# Patient Record
Sex: MALE | Race: WHITE | NOT HISPANIC OR LATINO | URBAN - METROPOLITAN AREA
[De-identification: names, ages, dates, MRNs, and addresses within clinical notes are randomized per-mention and may not be internally consistent; named-entity substitution may affect disease eponyms.]

---

## 2024-09-24 ENCOUNTER — APPOINTMENT (OUTPATIENT)
Dept: SURGERY | Facility: CLINIC | Age: 39
End: 2024-09-24
Payer: COMMERCIAL

## 2024-09-24 VITALS
RESPIRATION RATE: 16 BRPM | WEIGHT: 217.4 LBS | TEMPERATURE: 97.8 F | HEIGHT: 74 IN | DIASTOLIC BLOOD PRESSURE: 77 MMHG | BODY MASS INDEX: 27.9 KG/M2 | HEART RATE: 81 BPM | SYSTOLIC BLOOD PRESSURE: 112 MMHG | OXYGEN SATURATION: 99 %

## 2024-09-24 DIAGNOSIS — K40.90 INGUINAL HERNIA OF RIGHT SIDE WITHOUT OBSTRUCTION OR GANGRENE: ICD-10-CM

## 2024-09-24 DIAGNOSIS — K40.90 INGUINAL HERNIA OF RIGHT SIDE WITHOUT OBSTRUCTION OR GANGRENE: Primary | ICD-10-CM

## 2024-09-24 PROCEDURE — 3008F BODY MASS INDEX DOCD: CPT | Performed by: SURGERY

## 2024-09-24 PROCEDURE — 1036F TOBACCO NON-USER: CPT | Performed by: SURGERY

## 2024-09-24 PROCEDURE — 99203 OFFICE O/P NEW LOW 30 MIN: CPT | Performed by: SURGERY

## 2024-09-24 RX ORDER — CEFAZOLIN SODIUM 2 G/100ML
2 INJECTION, SOLUTION INTRAVENOUS ONCE
OUTPATIENT
Start: 2024-09-24 | End: 2024-09-24

## 2024-09-24 RX ORDER — SODIUM CHLORIDE, SODIUM LACTATE, POTASSIUM CHLORIDE, CALCIUM CHLORIDE 600; 310; 30; 20 MG/100ML; MG/100ML; MG/100ML; MG/100ML
20 INJECTION, SOLUTION INTRAVENOUS CONTINUOUS
OUTPATIENT
Start: 2024-09-24

## 2024-09-24 ASSESSMENT — ENCOUNTER SYMPTOMS
ENDOCRINE NEGATIVE: 1
NEUROLOGICAL NEGATIVE: 1
RESPIRATORY NEGATIVE: 1
PSYCHIATRIC NEGATIVE: 1
HEMATOLOGIC/LYMPHATIC NEGATIVE: 1
CARDIOVASCULAR NEGATIVE: 1
ALLERGIC/IMMUNOLOGIC NEGATIVE: 1
GASTROINTESTINAL NEGATIVE: 1
CONSTITUTIONAL NEGATIVE: 1

## 2024-09-24 NOTE — H&P (VIEW-ONLY)
"Subjective   Patient ID: Saqib Oliveira \"Joao" is a 39 y.o. adult who presents for New Patient Visit and Hernia (NPV- Right Inguinal Hernia ).  Patient saw PCP due to several months of R groin swelling and was diagnosed with a R inguinal hernia, confirmed on US. Was referred to Dr Kate at [facility] and was scheduled for lap inguinal hernia repair next month, but is interested in having surgery earlier due to social and family obligations. Not having worse symptoms, just discomfort on occasion, often following being on his feet for long periods of time. Denies chronic cough, constipation, change in bowel habits, or skin changes in groin.         Review of Systems   Constitutional: Negative.    HENT: Negative.     Respiratory: Negative.     Cardiovascular: Negative.    Gastrointestinal: Negative.    Endocrine: Negative.    Genitourinary: Negative.    Musculoskeletal:         R groin pain and bulge.   Skin: Negative.    Allergic/Immunologic: Negative.    Neurological: Negative.    Hematological: Negative.    Psychiatric/Behavioral: Negative.         Objective   Physical Exam  Constitutional:       Appearance: Normal appearance.   HENT:      Head: Normocephalic and atraumatic.   Cardiovascular:      Rate and Rhythm: Normal rate and regular rhythm.   Pulmonary:      Effort: Pulmonary effort is normal.      Breath sounds: Normal breath sounds.   Abdominal:      General: Abdomen is flat. There is no distension.      Palpations: Abdomen is soft.      Tenderness: There is no abdominal tenderness.      Comments: Well-healed lap incisions.    Musculoskeletal:         General: Normal range of motion.      Comments: R groin bulge, palpable inguinal hernia, reducible, non-tender. No palpable L sided hernia.    Skin:     General: Skin is warm and dry.   Neurological:      General: No focal deficit present.      Mental Status: John is alert and oriented to person, place, and time.   Psychiatric:         Mood and Affect: Mood normal.       "   Behavior: Behavior normal.         Assessment/Plan   Diagnoses and all orders for this visit:  Inguinal hernia of right side without obstruction or gangrene  -     Will schedule for lap R inguinal hernia repair, possible L in coming weeks.     Discussion of the risks of surgery including bleeding, infection, injury to adjacent structures, sexual dysfunction, need for laparotomy, or death was complete. Patient's questions answered. Agrees to proceed. Consent signed.     Other orders  -     NPO Diet Except: Sips with meds; Effective now; Standing  -     Height and weight; Standing  -     Insert and maintain peripheral IV; Standing  -     Saline lock IV; Standing  -     Type And Screen; Standing  -     lactated Ringer's infusion  -     Place in outpatient/hospital ambulatory surgery; Standing  -     Full code; Standing  -     Vital Signs; Standing  -     Pulse oximetry, spot; Standing  -     Apply sequential compression device; Standing  -     ceFAZolin (Ancef) 2 g in dextrose (iso)  mL         Robbi Cornejo MD   General Surgery   09/24/24 9:28 AM

## 2024-09-24 NOTE — PROGRESS NOTES
"Subjective   Patient ID: Saqib Oliveira \"Joao" is a 39 y.o. adult who presents for New Patient Visit and Hernia (NPV- Right Inguinal Hernia ).  Patient saw PCP due to several months of R groin swelling and was diagnosed with a R inguinal hernia, confirmed on US. Was referred to Dr Kate at [facility] and was scheduled for lap inguinal hernia repair next month, but is interested in having surgery earlier due to social and family obligations. Not having worse symptoms, just discomfort on occasion, often following being on his feet for long periods of time. Denies chronic cough, constipation, change in bowel habits, or skin changes in groin.         Review of Systems   Constitutional: Negative.    HENT: Negative.     Respiratory: Negative.     Cardiovascular: Negative.    Gastrointestinal: Negative.    Endocrine: Negative.    Genitourinary: Negative.    Musculoskeletal:         R groin pain and bulge.   Skin: Negative.    Allergic/Immunologic: Negative.    Neurological: Negative.    Hematological: Negative.    Psychiatric/Behavioral: Negative.         Objective   Physical Exam  Constitutional:       Appearance: Normal appearance.   HENT:      Head: Normocephalic and atraumatic.   Cardiovascular:      Rate and Rhythm: Normal rate and regular rhythm.   Pulmonary:      Effort: Pulmonary effort is normal.      Breath sounds: Normal breath sounds.   Abdominal:      General: Abdomen is flat. There is no distension.      Palpations: Abdomen is soft.      Tenderness: There is no abdominal tenderness.      Comments: Well-healed lap incisions.    Musculoskeletal:         General: Normal range of motion.      Comments: R groin bulge, palpable inguinal hernia, reducible, non-tender. No palpable L sided hernia.    Skin:     General: Skin is warm and dry.   Neurological:      General: No focal deficit present.      Mental Status: John is alert and oriented to person, place, and time.   Psychiatric:         Mood and Affect: Mood normal.       "   Behavior: Behavior normal.         Assessment/Plan   Diagnoses and all orders for this visit:  Inguinal hernia of right side without obstruction or gangrene  -     Will schedule for lap R inguinal hernia repair, possible L in coming weeks.     Discussion of the risks of surgery including bleeding, infection, injury to adjacent structures, sexual dysfunction, need for laparotomy, or death was complete. Patient's questions answered. Agrees to proceed. Consent signed.     Other orders  -     NPO Diet Except: Sips with meds; Effective now; Standing  -     Height and weight; Standing  -     Insert and maintain peripheral IV; Standing  -     Saline lock IV; Standing  -     Type And Screen; Standing  -     lactated Ringer's infusion  -     Place in outpatient/hospital ambulatory surgery; Standing  -     Full code; Standing  -     Vital Signs; Standing  -     Pulse oximetry, spot; Standing  -     Apply sequential compression device; Standing  -     ceFAZolin (Ancef) 2 g in dextrose (iso)  mL         Robbi Cornejo MD   General Surgery   09/24/24 9:28 AM

## 2024-10-02 ENCOUNTER — PRE-ADMISSION TESTING (OUTPATIENT)
Dept: PREADMISSION TESTING | Facility: HOSPITAL | Age: 39
End: 2024-10-02
Payer: COMMERCIAL

## 2024-10-02 VITALS
HEART RATE: 78 BPM | OXYGEN SATURATION: 98 % | HEIGHT: 74 IN | WEIGHT: 216.71 LBS | DIASTOLIC BLOOD PRESSURE: 86 MMHG | BODY MASS INDEX: 27.81 KG/M2 | RESPIRATION RATE: 16 BRPM | SYSTOLIC BLOOD PRESSURE: 126 MMHG | TEMPERATURE: 97.5 F

## 2024-10-02 DIAGNOSIS — Z01.818 PRE-OP TESTING: ICD-10-CM

## 2024-10-02 DIAGNOSIS — Z01.818 PRE-OP EXAMINATION: Primary | ICD-10-CM

## 2024-10-02 DIAGNOSIS — K40.90 INGUINAL HERNIA OF RIGHT SIDE WITHOUT OBSTRUCTION OR GANGRENE: ICD-10-CM

## 2024-10-02 PROCEDURE — 99202 OFFICE O/P NEW SF 15 MIN: CPT | Performed by: NURSE PRACTITIONER

## 2024-10-02 PROCEDURE — 87081 CULTURE SCREEN ONLY: CPT | Mod: STJLAB

## 2024-10-02 RX ORDER — CHLORHEXIDINE GLUCONATE ORAL RINSE 1.2 MG/ML
SOLUTION DENTAL
Qty: 473 ML | Refills: 0 | Status: SHIPPED | OUTPATIENT
Start: 2024-10-02

## 2024-10-02 ASSESSMENT — DUKE ACTIVITY SCORE INDEX (DASI)
CAN YOU WALK INDOORS, SUCH AS AROUND YOUR HOUSE: YES
CAN YOU HAVE SEXUAL RELATIONS: YES
TOTAL_SCORE: 50.7
CAN YOU DO LIGHT WORK AROUND THE HOUSE LIKE DUSTING OR WASHING DISHES: YES
CAN YOU PARTICIPATE IN MODERATE RECREATIONAL ACTIVITIES LIKE GOLF, BOWLING, DANCING, DOUBLES TENNIS OR THROWING A BASEBALL OR FOOTBALL: YES
CAN YOU TAKE CARE OF YOURSELF (EAT, DRESS, BATHE, OR USE TOILET): YES
CAN YOU CLIMB A FLIGHT OF STAIRS OR WALK UP A HILL: YES
CAN YOU DO HEAVY WORK AROUND THE HOUSE LIKE SCRUBBING FLOORS OR LIFTING AND MOVING HEAVY FURNITURE: YES
CAN YOU WALK A BLOCK OR TWO ON LEVEL GROUND: YES
CAN YOU DO MODERATE WORK AROUND THE HOUSE LIKE VACUUMING, SWEEPING FLOORS OR CARRYING GROCERIES: YES
DASI METS SCORE: 9
CAN YOU PARTICIPATE IN STRENOUS SPORTS LIKE SWIMMING, SINGLES TENNIS, FOOTBALL, BASKETBALL, OR SKIING: NO
CAN YOU DO YARD WORK LIKE RAKING LEAVES, WEEDING OR PUSHING A MOWER: YES
CAN YOU RUN A SHORT DISTANCE: YES

## 2024-10-02 ASSESSMENT — PAIN - FUNCTIONAL ASSESSMENT: PAIN_FUNCTIONAL_ASSESSMENT: 0-10

## 2024-10-02 ASSESSMENT — ACTIVITIES OF DAILY LIVING (ADL): ADL_SCORE: 0

## 2024-10-02 ASSESSMENT — LIFESTYLE VARIABLES: SMOKING_STATUS: NONSMOKER

## 2024-10-02 ASSESSMENT — PAIN SCALES - GENERAL: PAINLEVEL_OUTOF10: 0 - NO PAIN

## 2024-10-02 NOTE — PREPROCEDURE INSTRUCTIONS
Thank you for visiting Preadmission Testing at [facility]. If you have any changes to your health condition, please call the SURGEON's office to alert them and give them details of your symptoms.        Preoperative Brain Exercises    What are brain exercises?  A brain exercise is any activity that engages your thinking (cognitive) skills.    What types of activities are considered brain exercises?  Jigsaw puzzles, crossword puzzles, word jumble, memory games, word search, and many more.  Many can be found free online or on your phone via a mobile scar.    Why should I do brain exercises before my surgery?  More recent research has shown brain exercise before surgery can lower the risk of postoperative delirium (confusion) which can be especially important for older adults.  Patients who did brain exercises for 5 to 10 hours the days before surgery, cut their risk of postoperative delirium in half up to 1 week after surgery.      Preoperative Deep Breathing Exercises    Why it is important to do deep breathing exercises before my surgery?  Deep breathing exercises strengthen your breathing muscles.  This helps you to recover after your surgery and decreases the chance of breathing complications.    How are the deep breathing exercises done?  Sit straight with your back supported.  Breathe in deeply and slowly through your nose. Your lower rib cage should expand and your abdomen may move forward.  Hold that breath for 3 to 5 seconds.  Breathe out through pursed lips, slowly and completely.  Rest and repeat 10 times every hour while awake.  Rest longer if you become dizzy or lightheaded.      Patient and Family Education   Ways You Can Help Prevent Blood Clots     This handout explains some simple things you can do to help prevent blood clots.      Blood clots are blockages that can form in the body's veins. When a blood clot forms in your deep veins, it may be called a deep vein thrombosis, or DVT for short. Blood clots can  happen in any part of the body where blood flows, but they are most common in the arms and legs. If a piece of a blood clot breaks free and travels to the lungs, it is called a pulmonary embolus (PE). A PE can be a very serious problem.      Being in the hospital or having surgery can raise your chances of getting a blood clot because you may not be well enough to move around as much as you normally do.      Ways you can help prevent blood clots in the hospital         Wearing SCDs. SCDs stands for Sequential Compression Devices.   SCDs are special sleeves that wrap around your legs  They attach to a pump that fills them with air to gently squeeze your legs every few minutes.   This helps return the blood in your legs to your heart.   SCDs should only be taken off when walking or bathing.   SCDs may not be comfortable, but they can help save your life.               Wearing compression stockings - if your doctor orders them. These special snug fitting stockings gently squeeze your legs to help blood flow.       Walking. Walking helps move the blood in your legs.   If your doctor says it is ok, try walking the halls at least   5 times a day. Ask us to help you get up, so you don't fall.      Taking any blood thinning medicines your doctor orders.          ©[facility]; 3/23        Ways you can help prevent blood clots at home       Wearing compression stockings - if your doctor orders them. ? Walking - to help move the blood in your legs.       Taking any blood thinning medicines your doctor orders.      Signs of a blood clot or PE      Tell your doctor or nurse know right away if you have of the problems listed below.    If you are at home, seek medical care right away. Call 911 for chest pain or problems breathing.          Signs of a blood clot (DVT) - such as pain,  swelling, redness or warmth in your arm or leg      Signs of a pulmonary embolism (PE) - such as chest     pain or feeling short of breath

## 2024-10-02 NOTE — PREPROCEDURE INSTRUCTIONS
Medication List            Accurate as of October 2, 2024  9:09 AM. Always use your most recent med list.                chlorhexidine 0.12 % solution  Commonly known as: Peridex  Swish and spit 15 ml for 2 doses, 15mL the night before surgery and 15 ml morning of surgery - swish for 30 seconds -DO NOT SWALLOW, SPIT OUT                                  PRE-OPERATIVE INSTRUCTIONS    You will receive notification one business day prior to your procedure to confirm your arrival time. It is important that you answer your phone and/or check your messages during this time. If you do not hear from the surgery center by 5 pm. the day before your procedure, please call [phone].     Please enter the building through the Outpatient entrance and take the elevator off the lobby to the 2nd floor then check in at the Outpatient Surgery desk on the 2nd floor.    INSTRUCTIONS:  Talk to your surgeon for instructions if you should stop your aspirin, blood thinner, or diabetes medicines.  DO NOT take any multivitamins or over the counter supplements for 7-10 days before surgery.  If not being admitted, you must have an adult immediately available to drive you home after surgery. We also highly recommend you have someone stay with you for the entire day and night of your surgery.  For children having surgery, a parent or legal guardian must accompany them to the surgery center. If this is not possible, please call [phone] to make additional arrangements.  For adults who are unable to consent or make medical decisions for themselves, a legal guardian or Power of  must accompany them to the surgery center. If this is not possible, please call [phone] to make additional arrangements.  Wear comfortable, loose fitting clothing.  All jewelry and piercings must be removed. If you are unable to remove an item or have a dermal piercing, please be sure to tell the nurse when you arrive for surgery.  Nail polish and  make-up must be removed.  Avoid smoking or consuming alcohol for 24 hours before surgery.  To help prevent infection, please take a shower/bath and wash your hair the night before and/or morning of surgery (or follow other specific bathing instructions provided).    Preoperative Fasting Guidelines    Why must I stop eating and drinking near surgery time?  With sedation, food or liquid in your stomach can enter your lungs causing serious complications  Increases nausea and vomiting    When do I need to stop eating and drinking before my surgery?  Do not eat any solid food after midnight the night before your surgery/procedure unless otherwise instructed by your surgeon.   You may have up to 13.5 ounces of clear liquid until TWO hours before your instructed arrival time to the hospital.  This includes water, black tea/coffee, (no milk or cream) apple juice, and electrolyte drinks (Gatorade).   You may chew gum until TWO hours before your surgery/procedure      If applicable, notify your surgeons office immediately of any new skin changes that occur to the surgical limb.      If you have any questions or concerns, please call Pre-Admission Testing at [phone].

## 2024-10-02 NOTE — CPM/PAT H&P
"CPM/PAT Evaluation       Name: Saqib Oliveira (Saqib Oliveira \"John\")  /Age: 1985/39 y.o.     In-Person       Chief Complaint: Scheduled for right side inguinal hernia repair    HPI    ANTHONY is a 40 yo male who developed a right sided bulge in his groin about 6 to 8 weeks ago. The bulge is reducible and uncomfortable at times. He was seen by PCP who determined this was a right sided inguinal hernia. He was then referred to general surgery and subsequently scheduled for laparoscopic repair of right inguinal hernia. Presents to [facility] today for perioperative risk stratification and optimization. He denies any acute pains or bruising from region.Otherwise denies any recent illness, fever/chills, chest pains or shortness of breath.     Past Medical History:   Diagnosis Date    DDD (degenerative disc disease), cervical     Inguinal hernia     right side    Nasal septum fracture     Personal history of other diseases of the circulatory system     History of varicocele    Personal history of other diseases of the respiratory system     History of extrinsic asthma     PCP: Dr. Farah    Past Surgical History:   Procedure Laterality Date    APPENDECTOMY      OTHER SURGICAL HISTORY  2021    Nose surgery x3 ( two as pediatric and one as adult)       Patient  has no history on file for sexual activity.    Family History   Problem Relation Name Age of Onset    Emphysema Paternal Grandfather      Heart attack Paternal Grandfather         No Known Allergies    Prior to Admission medications    Not on File        PAT ROS   Constitutional: Negative for fever, chills, or sweats   ENMT: Negative for nasal discharge, congestion, ear pain, mouth pain, throat pain   Respiratory: Negative for cough, wheezing, shortness of breath   Cardiac: Negative for chest pain, dyspnea on exertion, palpitations     Gastrointestinal: Negative for nausea, vomiting, diarrhea, constipation, abdominal pain  + right inguinal hernia bulge with " discomforts    Genitourinary: Negative for dysuria, flank pain, frequency, hematuria   Musculoskeletal: Negative for decreased ROM, pain, swelling, weakness   Neurological: Negative for dizziness, confusion, headache  Psychiatric: Negative for mood changes   Skin: Negative for itching, rash, ulcer    Hematologic/Lymph: Negative for bruising, easy bleeding  Allergic/Immunologic: Negative itching, sneezing, swelling      Physical Exam  Constitutional:       Appearance: Normal appearance.   HENT:      Head: Normocephalic.      Mouth/Throat:      Mouth: Mucous membranes are moist.   Eyes:      Extraocular Movements: Extraocular movements intact.   Cardiovascular:      Rate and Rhythm: Normal rate and regular rhythm.   Pulmonary:      Effort: Pulmonary effort is normal.      Breath sounds: Normal breath sounds.   Abdominal:      General: Abdomen is flat.      Palpations: Abdomen is soft.      Hernia: A hernia is present. Hernia is present in the right inguinal area.   Musculoskeletal:         General: Normal range of motion.      Cervical back: Normal range of motion.   Skin:     General: Skin is warm and dry.   Neurological:      General: No focal deficit present.      Mental Status: John is alert.   Psychiatric:         Mood and Affect: Mood normal.          PAT AIRWAY:   Airway:     Neck ROM::  Full  normal        Visit Vitals  /86   Pulse 78   Temp 36.4 °C (97.5 °F) (Temporal)   Resp 16       DASI Risk Score      Flowsheet Row Pre-Admission Testing from 10/2/2024 in [facility]   Can you take care of yourself (eat, dress, bathe, or use toilet)?  2.75 filed at 10/02/2024 0852   Can you walk indoors, such as around your house? 1.75 filed at 10/02/2024 0852   Can you walk a block or two on level ground?  2.75 filed at 10/02/2024 0852   Can you climb a flight of stairs or walk up a hill? 5.5 filed at 10/02/2024 0852   Can you run a short distance? 8 filed at 10/02/2024 0852   Can you do light work  around the house like dusting or washing dishes? 2.7 filed at 10/02/2024 0852   Can you do moderate work around the house like vacuuming, sweeping floors or carrying groceries? 3.5 filed at 10/02/2024 0852   Can you do heavy work around the house like scrubbing floors or lifting and moving heavy furniture?  8 filed at 10/02/2024 0852   Can you do yard work like raking leaves, weeding or pushing a mower? 4.5 filed at 10/02/2024 0852   Can you have sexual relations? 5.25 filed at 10/02/2024 0852   Can you participate in moderate recreational activities like golf, bowling, dancing, doubles tennis or throwing a baseball or football? 6 filed at 10/02/2024 0852   Can you participate in strenous sports like swimming, singles tennis, football, basketball, or skiing? 0 filed at 10/02/2024 0852   DASI SCORE 50.7 filed at 10/02/2024 0852   METS Score (Will be calculated only when all the questions are answered) 9 filed at 10/02/2024 0852          Caprini DVT Assessment      Flowsheet Row Pre-Admission Testing from 10/2/2024 in [facility]   DVT Score 3 filed at 10/02/2024 0851   Surgical Factors Major surgery planned, including arthroscopic and laproscopic (1-2 hours) filed at 10/02/2024 0851   BMI 30 or less filed at 10/02/2024 0851          Modified Frailty Index      Flowsheet Row Pre-Admission Testing from 10/2/2024 in [facility]   Non-independent functional status (problems with dressing, bathing, personal grooming, or cooking) 0 filed at 10/02/2024 0852   History of diabetes mellitus  0 filed at 10/02/2024 0852   History of COPD 0 filed at 10/02/2024 0852   History of CHF No filed at 10/02/2024 0852   History of MI 0 filed at 10/02/2024 0852   History of Percutaneous Coronary Intervention, Cardiac Surgery, or Angina No filed at 10/02/2024 0852   Hypertension requiring the use of medication  0 filed at 10/02/2024 0852   Peripheral vascular disease 0 filed at 10/02/2024 0852   Impaired  sensorium (cognitive impairement or loss, clouding, or delirium) 0 filed at 10/02/2024 0852   TIA or CVA withouy residual deficit 0 filed at 10/02/2024 0852   Cerebrovascular accident with deficit 0 filed at 10/02/2024 0852   Modified Frailty Index Calculator 0 filed at 10/02/2024 0852          CHADS2 Stroke Risk  Current as of 11 minutes ago        N/A 3 to 100%: High Risk   2 to < 3%: Medium Risk   0 to < 2%: Low Risk     Last Change: N/A          This score determines the patient's risk of having a stroke if the patient has atrial fibrillation.        This score is not applicable to this patient. Components are not calculated.          Revised Cardiac Risk Index      Flowsheet Row Pre-Admission Testing from 10/2/2024 in [facility]   High-Risk Surgery (Intraperitoneal, Intrathoracic,Suprainguinal vascular) 0 filed at 10/02/2024 0852   History of ischemic heart disease (History of MI, History of positive exercuse test, Current chest paint considered due to myocardial ischemia, Use of nitrate therapy, ECG with pathological Q Waves) 0 filed at 10/02/2024 0852   History of congestive heart failure (pulmonary edemia, bilateral rales or S3 gallop, Paroxysmal nocturnal dyspnea, CXR showing pulmonary vascular redistribution) 0 filed at 10/02/2024 0852   History of cerebrovascular disease (Prior TIA or stroke) 0 filed at 10/02/2024 0852   Pre-operative insulin treatment 0 filed at 10/02/2024 0852   Pre-operative creatinine>2 mg/dl 0 filed at 10/02/2024 0852   Revised Cardiac Risk Calculator 0 filed at 10/02/2024 0852          Apfel Simplified Score      Flowsheet Row Pre-Admission Testing from 10/2/2024 in [facility]   Smoking status 1 filed at 10/02/2024 0852   History of motion sickness or PONV  0 filed at 10/02/2024 0852   Use of postoperative opioids 1 filed at 10/02/2024 0852   Gender - Female 0=No filed at 10/02/2024 0852   Apfel Simplified Score Calculator 2 filed at 10/02/2024 0852           Risk Analysis Index Results This Encounter         10/2/2024  0852             Do you live in a place other than your own home?: 0    When did you begin living in the place you are currently residing?: Greater than one year ago    Any kidney failure, kidney not working well, or seeing a kidney doctor (nephrologist)? If yes, was this for kidney stones or another problem?: 0 No    Any history of chronic (long-term) congestive heart failure (CHF)?: 0 No    Any shortness of breath when resting?: 0 No    In the past five years, have you been diagnosed with or treated for cancer?: No    During the last 3 months has it become difficult for you to remember things or organize your thoughts?: 0 No    Have you lost weight of 10 pounds or more in the past 3 months without trying?: 0 No    Do you have any loss of appetitie?: 0 No    Getting Around (Mobility): 0 Can get around without help    Eatin Can plan and prepare own meals    Toiletin Can use toilet without any help    Personal Hygiene (Bathing, Hand Washing, Changing Clothes): 0 Can shower or bathe without any help    TORO Cancer History: Patient does not indicate history of cancer    Total Risk Analysis Index Score Without Cancer: 11    Total Risk Analysis Index Score: 11          Stop Bang Score      Flowsheet Row [place] [facility]   Do you snore loudly? 0 filed at 10/02/2024 0851   Do you often feel tired or fatigued after your sleep? 0 filed at 10/02/2024 0851   Has anyone ever observed you stop breathing in your sleep? 0 filed at 10/02/2024 0851   Do you have or are you being treated for high blood pressure? 0 filed at 10/02/2024 0851   Recent BMI (Calculated) 27.8 filed at 10/02/2024 0851   Is BMI greater than 35 kg/m2? 0=No filed at 10/02/2024 0851   Age older than 50 years old? 0=No filed at 10/02/2024 0851   Is your neck circumference greater than 17 inches (Male) or 16 inches (Female)? 0 filed at  10/02/2024 0851   Gender - Male 1=Yes filed at 10/02/2024 0851   STOP-BANG Total Score 1 filed at 10/02/2024 0851            Assessment and Plan:     Pre-Op  39-year-old male scheduled for right laparoscopic inguinal hernia repair on 10/7/2024 with Dr. Cornejo. MRSA and oral chlorhexidine prescribed. Otherwise no further orders indicated.     Cardiac  -No diagnosis or significant findings on chart review or clinical presentation and evaluation.  DASI Score: 50.7   MET Score: 9  RCRI 0, 3.9% risk for postoperative MACE    Pulmonary  -Asthma, exercise-induced-as a child (resolved)  -Preoperative deep breathing educational handout provided to patient.  -STOP BAN   points which is a low risk for moderate to severe ELISEO    Neuro  DDD, cervical spine    GI  -Inguinal hernia, right side-reason for surgery  Apfel: 2 points 39% risk for post operative N/V    ENT  Nasal septum fracture, has undergone surgery 3 times (2 as pediatric in 1 as adult)    Skin check: Patient was instructed to make surgeon aware of any skin changes/concerns prior to surgery.     Anesthesia:  Patient denies any anesthesia complications.     See risk scores as previously documented.

## 2024-10-04 LAB — STAPHYLOCOCCUS SPEC CULT: NORMAL

## 2024-10-07 ENCOUNTER — HOSPITAL ENCOUNTER (OUTPATIENT)
Facility: HOSPITAL | Age: 39
Setting detail: OUTPATIENT SURGERY
Discharge: HOME | End: 2024-10-07
Attending: SURGERY | Admitting: SURGERY
Payer: COMMERCIAL

## 2024-10-07 ENCOUNTER — ANESTHESIA (OUTPATIENT)
Dept: OPERATING ROOM | Facility: HOSPITAL | Age: 39
End: 2024-10-07
Payer: COMMERCIAL

## 2024-10-07 ENCOUNTER — ANESTHESIA EVENT (OUTPATIENT)
Dept: OPERATING ROOM | Facility: HOSPITAL | Age: 39
End: 2024-10-07
Payer: COMMERCIAL

## 2024-10-07 VITALS
BODY MASS INDEX: 27.72 KG/M2 | WEIGHT: 216 LBS | TEMPERATURE: 97.9 F | RESPIRATION RATE: 17 BRPM | HEART RATE: 98 BPM | OXYGEN SATURATION: 96 % | DIASTOLIC BLOOD PRESSURE: 65 MMHG | SYSTOLIC BLOOD PRESSURE: 118 MMHG | HEIGHT: 74 IN

## 2024-10-07 DIAGNOSIS — G89.18 POSTOPERATIVE PAIN: ICD-10-CM

## 2024-10-07 DIAGNOSIS — K40.90 INGUINAL HERNIA OF RIGHT SIDE WITHOUT OBSTRUCTION OR GANGRENE: Primary | ICD-10-CM

## 2024-10-07 PROCEDURE — 3700000001 HC GENERAL ANESTHESIA TIME - INITIAL BASE CHARGE: Performed by: SURGERY

## 2024-10-07 PROCEDURE — 2500000005 HC RX 250 GENERAL PHARMACY W/O HCPCS: Performed by: SURGERY

## 2024-10-07 PROCEDURE — 7100000009 HC PHASE TWO TIME - INITIAL BASE CHARGE: Performed by: SURGERY

## 2024-10-07 PROCEDURE — A49650 PR LAP,INGUINAL HERNIA REPR,INITIAL: Performed by: NURSE ANESTHETIST, CERTIFIED REGISTERED

## 2024-10-07 PROCEDURE — 2720000007 HC OR 272 NO HCPCS: Performed by: SURGERY

## 2024-10-07 PROCEDURE — 7100000002 HC RECOVERY ROOM TIME - EACH INCREMENTAL 1 MINUTE: Performed by: SURGERY

## 2024-10-07 PROCEDURE — 2500000004 HC RX 250 GENERAL PHARMACY W/ HCPCS (ALT 636 FOR OP/ED): Performed by: ANESTHESIOLOGY

## 2024-10-07 PROCEDURE — 2780000003 HC OR 278 NO HCPCS: Performed by: SURGERY

## 2024-10-07 PROCEDURE — C1781 MESH (IMPLANTABLE): HCPCS | Performed by: SURGERY

## 2024-10-07 PROCEDURE — 7100000001 HC RECOVERY ROOM TIME - INITIAL BASE CHARGE: Performed by: SURGERY

## 2024-10-07 PROCEDURE — 49650 LAP ING HERNIA REPAIR INIT: CPT | Performed by: SURGERY

## 2024-10-07 PROCEDURE — 7100000010 HC PHASE TWO TIME - EACH INCREMENTAL 1 MINUTE: Performed by: SURGERY

## 2024-10-07 PROCEDURE — 2500000004 HC RX 250 GENERAL PHARMACY W/ HCPCS (ALT 636 FOR OP/ED): Performed by: SURGERY

## 2024-10-07 PROCEDURE — 3600000003 HC OR TIME - INITIAL BASE CHARGE - PROCEDURE LEVEL THREE: Performed by: SURGERY

## 2024-10-07 PROCEDURE — 2500000004 HC RX 250 GENERAL PHARMACY W/ HCPCS (ALT 636 FOR OP/ED): Performed by: NURSE ANESTHETIST, CERTIFIED REGISTERED

## 2024-10-07 PROCEDURE — A49650 PR LAP,INGUINAL HERNIA REPR,INITIAL: Performed by: ANESTHESIOLOGY

## 2024-10-07 PROCEDURE — 3600000008 HC OR TIME - EACH INCREMENTAL 1 MINUTE - PROCEDURE LEVEL THREE: Performed by: SURGERY

## 2024-10-07 PROCEDURE — 2500000005 HC RX 250 GENERAL PHARMACY W/O HCPCS: Performed by: NURSE ANESTHETIST, CERTIFIED REGISTERED

## 2024-10-07 PROCEDURE — 3700000002 HC GENERAL ANESTHESIA TIME - EACH INCREMENTAL 1 MINUTE: Performed by: SURGERY

## 2024-10-07 PROCEDURE — 2500000001 HC RX 250 WO HCPCS SELF ADMINISTERED DRUGS (ALT 637 FOR MEDICARE OP): Performed by: ANESTHESIOLOGY

## 2024-10-07 DEVICE — BARD MESH, 3" X 6" (7.6 CM X 15 CM)
Type: IMPLANTABLE DEVICE | Site: ABDOMEN | Status: FUNCTIONAL
Brand: BARD

## 2024-10-07 RX ORDER — DIPHENHYDRAMINE HYDROCHLORIDE 50 MG/ML
INJECTION INTRAMUSCULAR; INTRAVENOUS AS NEEDED
Status: DISCONTINUED | OUTPATIENT
Start: 2024-10-07 | End: 2024-10-07

## 2024-10-07 RX ORDER — ROCURONIUM BROMIDE 50 MG/5 ML
SYRINGE (ML) INTRAVENOUS AS NEEDED
Status: DISCONTINUED | OUTPATIENT
Start: 2024-10-07 | End: 2024-10-07

## 2024-10-07 RX ORDER — LIDOCAINE HYDROCHLORIDE 10 MG/ML
0.1 INJECTION, SOLUTION INFILTRATION; PERINEURAL ONCE
Status: CANCELLED | OUTPATIENT
Start: 2024-10-07 | End: 2024-10-07

## 2024-10-07 RX ORDER — ONDANSETRON HYDROCHLORIDE 2 MG/ML
INJECTION, SOLUTION INTRAVENOUS AS NEEDED
Status: DISCONTINUED | OUTPATIENT
Start: 2024-10-07 | End: 2024-10-07

## 2024-10-07 RX ORDER — KETOROLAC TROMETHAMINE 30 MG/ML
INJECTION, SOLUTION INTRAMUSCULAR; INTRAVENOUS AS NEEDED
Status: DISCONTINUED | OUTPATIENT
Start: 2024-10-07 | End: 2024-10-07

## 2024-10-07 RX ORDER — GLYCOPYRROLATE 0.2 MG/ML
INJECTION INTRAMUSCULAR; INTRAVENOUS AS NEEDED
Status: DISCONTINUED | OUTPATIENT
Start: 2024-10-07 | End: 2024-10-07

## 2024-10-07 RX ORDER — CEFAZOLIN SODIUM 2 G/100ML
2 INJECTION, SOLUTION INTRAVENOUS ONCE
Status: COMPLETED | OUTPATIENT
Start: 2024-10-07 | End: 2024-10-07

## 2024-10-07 RX ORDER — PROPOFOL 10 MG/ML
INJECTION, EMULSION INTRAVENOUS AS NEEDED
Status: DISCONTINUED | OUTPATIENT
Start: 2024-10-07 | End: 2024-10-07

## 2024-10-07 RX ORDER — HYDROMORPHONE HYDROCHLORIDE 0.2 MG/ML
0.2 INJECTION INTRAMUSCULAR; INTRAVENOUS; SUBCUTANEOUS EVERY 5 MIN PRN
Status: DISCONTINUED | OUTPATIENT
Start: 2024-10-07 | End: 2024-10-07 | Stop reason: HOSPADM

## 2024-10-07 RX ORDER — SODIUM CHLORIDE, SODIUM LACTATE, POTASSIUM CHLORIDE, CALCIUM CHLORIDE 600; 310; 30; 20 MG/100ML; MG/100ML; MG/100ML; MG/100ML
20 INJECTION, SOLUTION INTRAVENOUS CONTINUOUS
Status: DISCONTINUED | OUTPATIENT
Start: 2024-10-07 | End: 2024-10-07 | Stop reason: HOSPADM

## 2024-10-07 RX ORDER — OXYCODONE HYDROCHLORIDE 5 MG/1
5 TABLET ORAL EVERY 4 HOURS PRN
Status: DISCONTINUED | OUTPATIENT
Start: 2024-10-07 | End: 2024-10-07 | Stop reason: HOSPADM

## 2024-10-07 RX ORDER — NEOSTIGMINE METHYLSULFATE 1 MG/ML
INJECTION INTRAVENOUS AS NEEDED
Status: DISCONTINUED | OUTPATIENT
Start: 2024-10-07 | End: 2024-10-07

## 2024-10-07 RX ORDER — MIDAZOLAM HYDROCHLORIDE 1 MG/ML
INJECTION, SOLUTION INTRAMUSCULAR; INTRAVENOUS AS NEEDED
Status: DISCONTINUED | OUTPATIENT
Start: 2024-10-07 | End: 2024-10-07

## 2024-10-07 RX ORDER — SODIUM CHLORIDE, SODIUM LACTATE, POTASSIUM CHLORIDE, CALCIUM CHLORIDE 600; 310; 30; 20 MG/100ML; MG/100ML; MG/100ML; MG/100ML
100 INJECTION, SOLUTION INTRAVENOUS CONTINUOUS
Status: DISCONTINUED | OUTPATIENT
Start: 2024-10-07 | End: 2024-10-07 | Stop reason: HOSPADM

## 2024-10-07 RX ORDER — ONDANSETRON 4 MG/1
4 TABLET, FILM COATED ORAL EVERY 8 HOURS PRN
Qty: 20 TABLET | Refills: 0 | Status: SHIPPED | OUTPATIENT
Start: 2024-10-07

## 2024-10-07 RX ORDER — LIDOCAINE HYDROCHLORIDE 20 MG/ML
INJECTION, SOLUTION EPIDURAL; INFILTRATION; INTRACAUDAL; PERINEURAL AS NEEDED
Status: DISCONTINUED | OUTPATIENT
Start: 2024-10-07 | End: 2024-10-07

## 2024-10-07 RX ORDER — OXYCODONE HYDROCHLORIDE 5 MG/1
5 TABLET ORAL EVERY 6 HOURS PRN
Qty: 15 TABLET | Refills: 0 | Status: SHIPPED | OUTPATIENT
Start: 2024-10-07

## 2024-10-07 RX ORDER — FENTANYL CITRATE 50 UG/ML
INJECTION, SOLUTION INTRAMUSCULAR; INTRAVENOUS AS NEEDED
Status: DISCONTINUED | OUTPATIENT
Start: 2024-10-07 | End: 2024-10-07

## 2024-10-07 RX ORDER — HYDROMORPHONE HYDROCHLORIDE 1 MG/ML
INJECTION, SOLUTION INTRAMUSCULAR; INTRAVENOUS; SUBCUTANEOUS AS NEEDED
Status: DISCONTINUED | OUTPATIENT
Start: 2024-10-07 | End: 2024-10-07

## 2024-10-07 RX ORDER — BUPIVACAINE HCL/EPINEPHRINE 0.5-1:200K
VIAL (ML) INJECTION AS NEEDED
Status: DISCONTINUED | OUTPATIENT
Start: 2024-10-07 | End: 2024-10-07 | Stop reason: HOSPADM

## 2024-10-07 SDOH — HEALTH STABILITY: MENTAL HEALTH: CURRENT SMOKER: 0

## 2024-10-07 ASSESSMENT — PAIN DESCRIPTION - DESCRIPTORS
DESCRIPTORS: DULL;SORE
DESCRIPTORS: BURNING;ACHING
DESCRIPTORS: ACHING

## 2024-10-07 ASSESSMENT — PAIN - FUNCTIONAL ASSESSMENT
PAIN_FUNCTIONAL_ASSESSMENT: 0-10

## 2024-10-07 ASSESSMENT — PAIN DESCRIPTION - LOCATION
LOCATION: ABDOMEN
LOCATION: ABDOMEN

## 2024-10-07 ASSESSMENT — COLUMBIA-SUICIDE SEVERITY RATING SCALE - C-SSRS
2. HAVE YOU ACTUALLY HAD ANY THOUGHTS OF KILLING YOURSELF?: NO
6. HAVE YOU EVER DONE ANYTHING, STARTED TO DO ANYTHING, OR PREPARED TO DO ANYTHING TO END YOUR LIFE?: NO
1. IN THE PAST MONTH, HAVE YOU WISHED YOU WERE DEAD OR WISHED YOU COULD GO TO SLEEP AND NOT WAKE UP?: NO

## 2024-10-07 ASSESSMENT — PAIN SCALES - GENERAL
PAINLEVEL_OUTOF10: 7
PAINLEVEL_OUTOF10: 3
PAINLEVEL_OUTOF10: 5 - MODERATE PAIN
PAINLEVEL_OUTOF10: 4
PAINLEVEL_OUTOF10: 7

## 2024-10-07 NOTE — OP NOTE
"Repair Inguinal Hernia Laparoscopy (R) Operative Note     Date: 10/7/2024  OR Location: [facility] OR    Name: Saqib Oliveira \"John\", : 1985, Age: 39 y.o., MRN: [id number], Sex: adult    Diagnosis  Pre-op Diagnosis      * Inguinal hernia of right side without obstruction or gangrene [K40.90] Post-op Diagnosis     * Inguinal hernia of right side without obstruction or gangrene [K40.90]     Procedures  Repair Inguinal Hernia Laparoscopy  79426 - MO LAPAROSCOPY SURG RPR INITIAL INGUINAL HERNIA      Surgeons      * Robbi Cornejo - Primary    Resident/Fellow/Other Assistant:  Surgeons and Role:  * No surgeons found with a matching role *    Procedure Summary  Anesthesia: General  ASA: I  Anesthesia Staff: Anesthesiologist: Dragan Roman MD; Jayson Cedillo MD  CRNA: JOSE Vigil-CRNA  Estimated Blood Loss: 100 mL  Intra-op Medications:   Administrations occurring from 1230 to 1435 on 10/07/24:   Medication Name Total Dose   lactated Ringer's infusion Cannot be calculated   ceFAZolin (Ancef) 2 g in dextrose (iso)  mL 2 g              Anesthesia Record               Intraprocedure I/O Totals          Intake    lactated Ringer's infusion 1000.00 mL    Total Intake 1000 mL          Specimen: No specimens collected     Staff:   Hughulator: Catherine  Circulator: Dangelo Brandt Person: Young Lopez Scrub: Samara Lopez Circulator: Jaxson         Drains and/or Catheters: * None in log *    Tourniquet Times:         Implants:  Implants       Type Name Action Serial No.      Surgical Mesh Sling Implant PATCH, MESH, MARLEX, 3 X 6 IN, POLYPROPYLENE - [id number] Implanted               Findings: right direct inguinal hernia with large cord lipoma. No left-sided hernia.     Indications: John Oliveira is an 39 y.o. adult who is having surgery for Inguinal hernia of right side without obstruction or gangrene [K40.90].     The patient was seen in the preoperative area. The risks, benefits, complications, treatment " options, non-operative alternatives, expected recovery and outcomes were discussed with the patient. The possibilities of reaction to medication, pulmonary aspiration, injury to surrounding structures, bleeding, recurrent infection, the need for additional procedures, failure to diagnose a condition, and creating a complication requiring transfusion or operation were discussed with the patient. The patient concurred with the proposed plan, giving informed consent.  The site of surgery was properly noted/marked if necessary per policy. The patient has been actively warmed in preoperative area. Preoperative antibiotics have been ordered and given within 1 hours of incision. Venous thrombosis prophylaxis have been ordered including bilateral sequential compression devices    Procedure Details: The patient was taken to the operating room. A presurgical huddle was completed which included the patient's identifiers, consent, procedure, and equipment. The patient was placed in the supine position and general endotracheal anesthesia was induced. The patient was prepped and draped in the normal sterile fashion. IV prophylactic antibiotics were given prior to incision.    A surgical time out was performed prior to incision. A supraumbilical incision was made and dissection was carried down to the umbilical stalk with cautery. This was elevated and the midline fascia was incised sharply. Blunt dissection was used to enter the peritoneal cavity. A finger sweep revealed adherent omentum to the prior supraumbilical incision. A 0 Vicryl figure of eight stitch was placed in the fascia and a 12 mm Jaiden trocar was inserted into the abdomen. The abdomen was insufflated to 15 mmHg. The scope was inserted and confirmed no injuries to underlying structures. Two 5 mm trocars were placed in the left and right midclavicular line. The patient was placed in Trendelenburg.     There was evidence of a right sided direct inguinal hernia. There  was no left-sided hernia. An incision was made in the anterior peritoneal wall 5 cm cephalad to the visualized hernia. The peritoneum was peeled away from the myopectineal orifice and the hernia sac was fully reduced. The Vas Deferens was visualized and protected along with the gonadal vessels by bluntly peeling away the sac from these structures. The femoral canal was visualized and no hernia was appreciated. The dissection was continued out laterally peeling peritoneum away from the abdominal wall toward the anterior superior iliac spine to create room for a mesh. A 7.6 cm x 15 cm mesh was placed to cover all potential hernia sites, centered on the visualized defect. The tacker was used to place a single tack on the pubic tubercle and two tacks laterally and superiorly to fixate the mesh. The peritoneal flap was then closed over the mesh using tacks.    The adherent omentum was sharply transected and was hemostatic.     All counts were correct. The abdomen was then desufflated and all ports were removed under direct visualization. 20 cc of 0.5% Marcaine was administered to the port sites. The port sites were closed with 4-0 monocryl subcutaneous suture, and covered with Dermabond skin adhesive.     All surgical counts were again correct. The patient tolerated the procedure well and was transferred to the PACU in stable condition.     Complications:  None; patient tolerated the procedure well.    Disposition: PACU - hemodynamically stable.  Condition: stable         Additional Details:     Attending Attestation: I was present and scrubbed for the entire procedure.    Robbi Cornejo  Phone Number: [phone]

## 2024-10-07 NOTE — ANESTHESIA PROCEDURE NOTES
Airway  Date/Time: 10/7/2024 12:55 PM  Urgency: elective    Airway not difficult    Staffing  Performed: CRNA   Authorized by: Dragan Roman MD    Performed by: JOSE Vigil-CRNA  Patient location during procedure: OR    Indications and Patient Condition  Indications for airway management: anesthesia  Spontaneous Ventilation: absent  Sedation level: deep  Preoxygenated: yes  Patient position: sniffing  Mask difficulty assessment: 1 - vent by mask    Final Airway Details  Final airway type: endotracheal airway      Successful airway: ETT  Cuffed: yes   Successful intubation technique: video laryngoscopy  Facilitating devices/methods: intubating stylet  Blade size: #4  ETT size (mm): 7.5  Cormack-Lehane Classification: grade IIb - view of arytenoids or posterior of glottis only  Placement verified by: capnometry   Measured from: teeth  ETT to teeth (cm): 23  Number of attempts at approach: 2  Ventilation between attempts: none  Number of other approaches attempted: 1    Other Attempts  Unsuccessful attempted endotracheal techniques: direct laryngoscopy    Additional Comments  Grade 3 view with DL using Mac 4

## 2024-10-07 NOTE — ANESTHESIA PREPROCEDURE EVALUATION
"Patient: Saqib Oliveira \"John\"    Procedure Information       Date/Time: 10/07/24 1230    Procedure: Repair Inguinal Hernia Laparoscopy (Right)    Location: [facility] OR 10 / [facility] OR    Surgeons: Robbi Cornejo MD            Relevant Problems   Anesthesia (within normal limits)      Cardiac (within normal limits)      Pulmonary  ELISEO Risk, snoring      Neuro (within normal limits)      GI (within normal limits)      /Renal (within normal limits)      Liver (within normal limits)      Endocrine (within normal limits)      Hematology (within normal limits)       Clinical information reviewed:   Tobacco  Allergies  Meds   Med Hx  Surg Hx   Fam Hx  Soc Hx        NPO Detail:  NPO/Void Status  Carbohydrate Drink Given Prior to Surgery? : N  Date of Last Liquid: 10/06/24  Time of Last Liquid: 2300  Date of Last Solid: 10/06/24  Time of Last Solid: 2300  Last Intake Type: Clear fluids  Time of Last Void: 0700         Physical Exam    Airway  Mallampati: II  TM distance: >3 FB  Neck ROM: full     Cardiovascular   Rhythm: regular  Rate: normal     Dental    Pulmonary - normal exam     Abdominal            Anesthesia Plan    History of general anesthesia?: yes  History of complications of general anesthesia?: no    ASA 1     general     The patient is not a current smoker.    intravenous induction   Anesthetic plan and risks discussed with patient.  Use of blood products discussed with patient who consented to blood products.      "

## 2024-10-08 ENCOUNTER — TELEPHONE (OUTPATIENT)
Dept: SURGERY | Facility: CLINIC | Age: 39
End: 2024-10-08
Payer: COMMERCIAL

## 2024-10-09 NOTE — ANESTHESIA POSTPROCEDURE EVALUATION
"Patient: Saqib Oliveira \"John\"    Procedure Summary       Date: 10/07/24 Room / Location: [facility] OR 10 / [facility]    Anesthesia Start: 1247 Anesthesia Stop: 1539    Procedure: Repair Inguinal Hernia Laparoscopy (Right: Abdomen) Diagnosis:       Inguinal hernia of right side without obstruction or gangrene      (Inguinal hernia of right side without obstruction or gangrene [K40.90])    Surgeons: Robbi Cornejo MD Responsible Provider: Jayson Cedillo MD    Anesthesia Type: general ASA Status: 1            Anesthesia Type: general    Vitals Value Taken Time   /67 10/07/24 1615   Temp 36 °C (96.8 °F) 10/07/24 1615   Pulse 84 10/07/24 1616   Resp 11 10/07/24 1616   SpO2 94 % 10/07/24 1616   Vitals shown include unfiled device data.    Anesthesia Post Evaluation    Patient location during evaluation: PACU  Patient participation: complete - patient participated  Level of consciousness: awake and alert  Pain management: satisfactory to patient  Airway patency: patent  Cardiovascular status: acceptable  Respiratory status: acceptable  Hydration status: acceptable  Postoperative Nausea and Vomiting: none        No notable events documented.    "

## 2024-10-14 ENCOUNTER — TELEPHONE (OUTPATIENT)
Dept: SURGERY | Facility: CLINIC | Age: 39
End: 2024-10-14
Payer: COMMERCIAL

## 2024-10-14 NOTE — TELEPHONE ENCOUNTER
Discussed with pt again his concerns,  pt now states his last bm was last night sometime after using a laxative,  admits to more liquid stool.  Last bm prior to that was 48 hours.  Pt now stating that he is only dribbles urine when he tries to urinate and can not get a stream going,  not emptying his bladder,  admits to abdominal distention and tenderness when asked.  Advised ED for bladder scan,  pt then states he doesn't know if he will go.  I reviewed symptoms with him again since this is the 3rd contact today with pt and office in regards to these symptoms.  And this afternoon pt describes symptoms differently and the urinating symptoms worse then this morning.  Again pt encouraged to go to the ED if he is not urinating.  During this call pt does not discuss the complaints of the tingling in bilateral lower extremities as discussed this am.  However I did tell pt that Dr. Cornejo recommended applying heat to help with symptoms.

## 2024-10-14 NOTE — TELEPHONE ENCOUNTER
Patient called back and states he feels like his constipation maybe returning. I advised patient to take OTC milk of magnesia. He states having some urine problems as well. He is not having a steady stream. He was advised by myself and Victoriano the nurse to go to the ER for further testing.   Patient is aware of the plan of care.

## 2024-10-14 NOTE — TELEPHONE ENCOUNTER
Pt concerned with urinary symptoms, constipation and tingling down both legs. Pt denies fever, chest pain or sob. States he did take something for constipation with relief however he is still having difficulty starting a urine stream and feeling like he is not emptying his bladder. Decreased his fluids because he did not want the constant urge. Educated pt to increase fluids, not decrease. Pt has appt with Dr. Cornejo on 10/15 which I advised he keep. Message sent to Dr. Cornejo about pt's concerns. Pt prefers to go to an urgicare for urinalysis today.

## 2024-10-15 ENCOUNTER — OFFICE VISIT (OUTPATIENT)
Dept: UROLOGY | Facility: CLINIC | Age: 39
End: 2024-10-15
Payer: COMMERCIAL

## 2024-10-15 ENCOUNTER — APPOINTMENT (OUTPATIENT)
Dept: SURGERY | Facility: CLINIC | Age: 39
End: 2024-10-15
Payer: COMMERCIAL

## 2024-10-15 VITALS
OXYGEN SATURATION: 98 % | DIASTOLIC BLOOD PRESSURE: 75 MMHG | TEMPERATURE: 97.7 F | HEART RATE: 90 BPM | RESPIRATION RATE: 16 BRPM | SYSTOLIC BLOOD PRESSURE: 114 MMHG

## 2024-10-15 VITALS
DIASTOLIC BLOOD PRESSURE: 80 MMHG | SYSTOLIC BLOOD PRESSURE: 130 MMHG | BODY MASS INDEX: 27.6 KG/M2 | WEIGHT: 215 LBS | HEART RATE: 94 BPM

## 2024-10-15 DIAGNOSIS — R33.8 POSTOPERATIVE URINARY RETENTION: ICD-10-CM

## 2024-10-15 DIAGNOSIS — N99.89 POSTOPERATIVE URINARY RETENTION: ICD-10-CM

## 2024-10-15 DIAGNOSIS — Z09 POSTOPERATIVE EXAMINATION: ICD-10-CM

## 2024-10-15 DIAGNOSIS — K40.90 INGUINAL HERNIA OF RIGHT SIDE WITHOUT OBSTRUCTION OR GANGRENE: Primary | ICD-10-CM

## 2024-10-15 PROCEDURE — 1036F TOBACCO NON-USER: CPT | Performed by: STUDENT IN AN ORGANIZED HEALTH CARE EDUCATION/TRAINING PROGRAM

## 2024-10-15 PROCEDURE — 99204 OFFICE O/P NEW MOD 45 MIN: CPT | Performed by: STUDENT IN AN ORGANIZED HEALTH CARE EDUCATION/TRAINING PROGRAM

## 2024-10-15 PROCEDURE — 1036F TOBACCO NON-USER: CPT | Performed by: SURGERY

## 2024-10-15 PROCEDURE — 99024 POSTOP FOLLOW-UP VISIT: CPT | Performed by: SURGERY

## 2024-10-15 RX ORDER — IBUPROFEN 200 MG
200 TABLET ORAL EVERY 6 HOURS
COMMUNITY

## 2024-10-15 RX ORDER — TAMSULOSIN HYDROCHLORIDE 0.4 MG/1
0.4 CAPSULE ORAL DAILY
Qty: 14 CAPSULE | Refills: 0 | Status: SHIPPED | OUTPATIENT
Start: 2024-10-16 | End: 2024-10-30

## 2024-10-15 ASSESSMENT — ENCOUNTER SYMPTOMS
GASTROINTESTINAL NEGATIVE: 1
RESPIRATORY NEGATIVE: 1
CONSTITUTIONAL NEGATIVE: 1
MUSCULOSKELETAL NEGATIVE: 1
CARDIOVASCULAR NEGATIVE: 1
PSYCHIATRIC NEGATIVE: 1
DIFFICULTY URINATING: 1
ALLERGIC/IMMUNOLOGIC NEGATIVE: 1
HEMATOLOGIC/LYMPHATIC NEGATIVE: 1
ENDOCRINE NEGATIVE: 1
NUMBNESS: 1

## 2024-10-15 NOTE — PROGRESS NOTES
"Subjective   Patient ID: Saqib Oliveira \"Joao" is a 39 y.o. adult who presents for Post-op (POV- RIHR done 10/7/24).  Patient had initially done well post-op, other than some lower abdominal discomfort, but on Sunday began having issues urinating. This worsened on Monday and he called our office and was advised to go to the ER. Went to [facility] and bladder scan showed > 1000 ml. Perez inserted with 1400 ml return, left in place. Other than this, patient reports ambulating well at home. Denies significant pain, but having some discomfort in his lower abdomen and pelvis. Also having mild numbness from the level of his umbilicus down both legs equally. This has been slowly improving. Had a couple of days of constipation, but is now having normal bowel function. Eating and drinking without issues. Denies nausea .        Review of Systems   Constitutional: Negative.    HENT: Negative.     Respiratory: Negative.     Cardiovascular: Negative.    Gastrointestinal: Negative.    Endocrine: Negative.    Genitourinary:  Positive for difficulty urinating.   Musculoskeletal: Negative.    Skin: Negative.    Allergic/Immunologic: Negative.    Neurological:  Positive for numbness.   Hematological: Negative.    Psychiatric/Behavioral: Negative.         Objective   Physical Exam  Vitals reviewed.   Constitutional:       Appearance: Normal appearance.   Cardiovascular:      Rate and Rhythm: Normal rate and regular rhythm.   Pulmonary:      Effort: Pulmonary effort is normal.      Breath sounds: Normal breath sounds.   Abdominal:      General: Abdomen is flat.      Palpations: Abdomen is soft.      Comments: Incisions healing well. Small seroma of umbilical incision.    Genitourinary:     Comments: Right groin non-tender. Small seroma of hernia sac. No swelling or erythema.   Musculoskeletal:         General: Normal range of motion.   Skin:     General: Skin is warm and dry.   Neurological:      Mental Status: John is alert and " oriented to person, place, and time.      Comments: Mild decreased sensation bilaterally from level of umbilicus to legs. Equal on both sides.          Assessment/Plan   Diagnoses and all orders for this visit:  Inguinal hernia of right side without obstruction or gangrene  Postoperative urinary retention    - Discussed urinary retention as it relates to hernia repair. Likely benign and should resolve once Perez removed in 1-2 days. Patient has follow-up with [facility] in 1-2 days.   - Discussed that numbness is likely not directly related to surgery as it is bilateral and not in distribution of inguinal nerves, but patient should monitor for improvement and call back with concerns.        Robbi Cornejo MD   General Surgery   10/15/24 9:50 AM

## 2024-10-15 NOTE — PROGRESS NOTES
"Subjective   Patient ID: Saqib Oliveira \"John\" is a 39 y.o. adult who presents for Post-op (POV- RIHR done 10/7/24).  HPI    Review of Systems    Objective   Physical Exam    Assessment/Plan            Robbi Cornejo MD 10/15/24 9:58 AM   "

## 2024-10-15 NOTE — PROGRESS NOTES
"Saqib Oliveira 1985 is a AGE@ adult seen today for follow-up.    Referred by: No referring provider defined for this encounter.    CC: Urinary retention    HPI:    10/15/2024:  Patient is a 39-year-old male with a past medical history significant for right inguinal hernia who underwent hernia repair on 10/7/2024 with Dr. Cornejo  8 days after surgery patient had significant abdominal discomfort and presented to the emergency room where he was found to be in urinary retention  Perez catheter was inserted and there was 1.3 L of urine within his bladder  He was discharged with follow-up to undergo trial of void  On encounter today patient has clear yellow urine within the catheter tubing      No results found for: \"PSA\"     reports that John has never smoked. John has never used smokeless tobacco. John reports current alcohol use of about 4.0 standard drinks of alcohol per week. John reports that John does not use drugs.    Current Outpatient Medications   Medication Sig Dispense Refill    ibuprofen 200 mg tablet Take 1 tablet (200 mg) by mouth every 6 hours.      chlorhexidine (Peridex) 0.12 % solution Swish and spit 15 ml for 2 doses, 15mL the night before surgery and 15 ml morning of surgery - swish for 30 seconds -DO NOT SWALLOW, SPIT OUT (Patient not taking: Reported on 10/15/2024) 473 mL 0    ondansetron (Zofran) 4 mg tablet Take 1 tablet (4 mg) by mouth every 8 hours if needed for nausea or vomiting. (Patient not taking: Reported on 10/15/2024) 20 tablet 0    oxyCODONE (Roxicodone) 5 mg immediate release tablet Take 1 tablet (5 mg) by mouth every 6 hours if needed for severe pain (7 - 10). (Patient not taking: Reported on 10/15/2024) 15 tablet 0    [START ON 10/16/2024] tamsulosin (Flomax) 0.4 mg 24 hr capsule Take 1 capsule (0.4 mg) by mouth once daily for 14 days. Do not fill before October 16, 2024. 14 capsule 0     No current facility-administered medications for this visit.       Past Surgical History: " "  Procedure Laterality Date    APPENDECTOMY      OTHER SURGICAL HISTORY  02/03/2021    Nose surgery x3 ( two as pediatric and one as adult)       Family History   Problem Relation Name Age of Onset    Emphysema Paternal Grandfather      Heart attack Paternal Grandfather        No Known Allergies     Past Medical History:   Diagnosis Date    DDD (degenerative disc disease), cervical     Inguinal hernia     right side    Nasal septum fracture     Personal history of other diseases of the circulatory system     History of varicocele    Personal history of other diseases of the respiratory system     History of extrinsic asthma        Review of Systems:   No fevers, chills, chest pain, or shortness of breath.     Physical Exam:  General: no Acute distress, appears well   Psych: Alert and oriented X 3  : Patent meatus and clear yellow urine in tubing    No results found for: \"WBC\", \"HGB\", \"HCT\", \"MCV\", \"PLT\"  No results found for: \"GLUCOSE\", \"CALCIUM\", \"NA\", \"K\", \"CO2\", \"CL\", \"BUN\", \"CREATININE\"  No results found for: \"PSA\"  No results found for: \"HGBA1C\"  CT abdomen pelvis w IV contrast    Result Date: 10/14/2024  * * *Final Report* * * DATE OF EXAM: Oct 14 2024  4:10PM   F[facility]   0530  -  CT ABD/PEL W IVCON  / ACCESSION #  [id number] PROCEDURE REASON: Abdominal pain, post-op      * * * * Physician Interpretation * * * *  EXAMINATION:  CT ABDOMEN AND PELVIS WITH IV CONTRAST CLINICAL HISTORY: One week post laparoscopic right inguinal hernia repair, urinary retention, suprapubic pain TECHNIQUE: CT of the abdomen and pelvis was performed using standard technique, scanning from just above the dome of the diaphragm to the symphysis pubis. MQ:  CTAP_3 Contrast: IV:  100 ml of Omnipaque 350 CT Radiation dose: Integrated Dose-length product (DLP) for this visit =   719 mGy*cm. CT Dose Reduction Employed: Automated exposure control (AEC)   COMPARISON: None. RESULT: Liver: There is diffusely fatty liver.  There are several less " "than 0.5 cm hypodense hepatic lesions, too small to characterize and probably benign. Biliary: No bile duct dilation.  The gallbladder is present.   Spleen: No mass. No splenomegaly. Pancreas: No mass or duct dilation. Adrenals: No mass. Right kidney: No renal mass.  No hydronephrosis. Left kidney: There is 2.2 x 2.3 cm exophytic hypodense left lower renal cyst posteriorly.  No hydronephrosis. GI tract: No dilation or wall thickening. The appendix is absent. Lymph nodes: No abdominal or pelvic lymphadenopathy. Mesentery/Peritoneum: No ascites or mass. Retroperitoneum: No mass. Vasculature: No abdominal aortic aneurysm.  Pelvis: Right inguinal hernia containing fluid and soft tissue stranding. The urinary bladder is decompressed by a Nicolas catheter. Bones/Soft Tissues: Mild degenerative changes.  There is mild subcutaneous fluid and air in the region of the umbilicus, compatible with postsurgical change of recent laparoscopic surgery. Lower thorax: Images of the lung bases demonstrate patchy right lower lobe atelectasis/infiltrate. Localizer images: No additional findings.    IMPRESSION: PATCHY RIGHT LOWER LOBE ATELECTASIS/INFILTRATE. RIGHT INGUINAL HERNIA CONTAINING FLUID AND SOFT TISSUE STRANDING AND POSTOPERATIVE SUBCUTANEOUS FLUID AND AIR IN THE REGION OF THE UMBILICUS. 2.2 X 2.3 CM EXOPHYTIC HYPODENSE LEFT LOWER RENAL CYST POSTERIORLY. : HALEY   Transcribe Date/Time: Oct 14 2024  4:12P Dictated by : BETO SERRANO MD This examination was interpreted and the report reviewed and electronically signed by: BETO SERRANO MD on Oct 14 2024  4:21PM  EST        Saqib \"John\" was seen today for nicolas removal.  Diagnoses and all orders for this visit:  Postoperative urinary retention  -     tamsulosin (Flomax) 0.4 mg 24 hr capsule; Take 1 capsule (0.4 mg) by mouth once daily for 14 days. Do not fill before October 16, 2024.     39-year-old male with a past medical history significant for right inguinal " hernia who underwent hernia repair on 10/7/2024 with Dr. Cornejo    #1 urinary retention after inguinal hernia surgery  I will plan to start the patient on Flomax  Will plan to see him back in 2 days for catheter removal to give him a total of 72 hours of bladder rest  Patient endorses understanding    The dictation was performed using Dragon software. Please excuse any errors. Please contact our office with any inquiries or clarifications.    Elvin Turner MD

## 2024-10-17 ENCOUNTER — CLINICAL SUPPORT (OUTPATIENT)
Dept: UROLOGY | Facility: CLINIC | Age: 39
End: 2024-10-17

## 2024-10-17 VITALS — SYSTOLIC BLOOD PRESSURE: 119 MMHG | TEMPERATURE: 97.9 F | DIASTOLIC BLOOD PRESSURE: 70 MMHG | HEART RATE: 99 BPM

## 2024-10-17 DIAGNOSIS — R33.9 URINARY RETENTION: ICD-10-CM

## 2024-10-17 PROCEDURE — 99211 OFF/OP EST MAY X REQ PHY/QHP: CPT | Performed by: NURSE PRACTITIONER

## 2024-10-17 NOTE — PROGRESS NOTES
Pt presented for trial of void and catheter removal. Pt failed trial of void. Pt tolerated catheter removal well. Pt stated that he has not had a BM in over a week since hernia procedure. Advised pt to go home and use a Fleet suppository to assist with BM. Advised pt that this could be causing issues with urinary retention. Advised pt to return to office if no BM or urination is achieved by 2 pm. Advised pt to return to office sooner if signs or symptoms of urinary retention worsen. Advised pt to contact office if BM and urination is achieved. Pt stated understanding.

## 2024-10-17 NOTE — PROGRESS NOTES
Pt called in to office stating he had BM and urinated. Pt stated that he felt he had not urinated enough and was concerned that this was only due to BM. Advised pt to drink 32-64 ounces of water and to walk. Advised pt that now that a BM has been had, urination should come on it's own. Advised pt is this does not occur by 2pm to return to the office for a PVR check and catheterization. Pt stated understanding.

## 2025-01-08 ENCOUNTER — APPOINTMENT (OUTPATIENT)
Dept: DERMATOLOGY | Facility: CLINIC | Age: 40
End: 2025-01-08
Payer: COMMERCIAL

## 2025-01-08 DIAGNOSIS — D23.9 DERMATOFIBROMA: ICD-10-CM

## 2025-01-08 DIAGNOSIS — Z12.83 ENCOUNTER FOR SCREENING FOR MALIGNANT NEOPLASM OF SKIN: ICD-10-CM

## 2025-01-08 DIAGNOSIS — D22.9 MELANOCYTIC NEVUS, UNSPECIFIED LOCATION: Primary | ICD-10-CM

## 2025-01-08 PROCEDURE — 1036F TOBACCO NON-USER: CPT | Performed by: DERMATOLOGY

## 2025-01-08 PROCEDURE — 99203 OFFICE O/P NEW LOW 30 MIN: CPT | Performed by: DERMATOLOGY

## 2025-01-08 NOTE — PROGRESS NOTES
Subjective     John Oliveira is a 39 y.o. adult who presents for the following: Skin Check (New. FBSE. Pt reports no hx. Pt reports areas of concern on Back and Left Lower Posterior Leg. ).     Skin Cancer Screening  John has a history of heavy sun exposure. John is in the sun daily. John uses sunscreen occasionally. John reports no skin symptoms. John's moles are growing.    Spots that concern John: Back and Left Lower Posterior Leg.           Review of Systems:  No other skin or systemic complaints other than what is documented elsewhere in the note.    The following portions of the chart were reviewed this encounter and updated as appropriate:       Skin Cancer History  No skin cancer on file.    Specialty Problems    None    Past Medical History:  John Oliveira  has a past medical history of DDD (degenerative disc disease), cervical, Inguinal hernia, Nasal septum fracture, Personal history of other diseases of the circulatory system, and Personal history of other diseases of the respiratory system.    Past Surgical History:  John Oliveira  has a past surgical history that includes Other surgical history (02/03/2021) and Appendectomy.    Family History:  Patient family history includes Emphysema in John's paternal grandfather; Heart attack in John's paternal grandfather.       Objective   Well appearing patient in no apparent distress; mood and affect are within normal limits.    A full examination was performed including scalp, head, eyes, ears, nose, lips, neck, chest, axillae, abdomen, back, buttocks, bilateral upper extremities, bilateral lower extremities, hands, feet, fingers, toes, fingernails, and toenails. All findings within normal limits unless otherwise noted below.    Assessment/Plan   1. Melanocytic nevus, unspecified location  All nevi were symmetric brown macules without atypia on dermoscopy.     The ABCDEs of melanoma and warning signs of non-melanoma skin cancer were discussed with patient and  patient expressed understanding. Sun protection and use of at least SPF 30 discussed with patient. Pt instructed to reapply every 2 hours.     2. Dermatofibroma  Left Lower Leg - Posterior  Hyperpigmented papule without atypia on dermoscopy.     The benign nature of the diagnosis was explained to patient.          Follow up 1-2 years or sooner as needed.

## (undated) DEVICE — Device

## (undated) DEVICE — SUTURE, VICRYL, 0, 27 IN, UR-6, VIOLET

## (undated) DEVICE — APPLICATOR, CHLORAPREP, W/ORANGE TINT, 26ML

## (undated) DEVICE — TROCAR SYSTEM, BALLOON, KII GELPORT, 12 X 100MM

## (undated) DEVICE — SUTURE, MONOCRYL, 4-0, 27 IN, PS-2, UNDYED

## (undated) DEVICE — ABSORBATACK, 5MM SHORT, SINGLE USE/W 30 ABSORBABLE TACKS

## (undated) DEVICE — CLEANSER, SKIN, ANTIMICROBIAL, BACTOSHIELD CHG, 4%, 1 QT

## (undated) DEVICE — GLOVE, SURGICAL, PROTEXIS PI BLUE W/NEUTHERA, 7.0, PF, LF

## (undated) DEVICE — TROCAR, KII OPTICAL BLADELESS 5MM Z THREAD 100MM LNGTH

## (undated) DEVICE — SCISSORS, METZ, CURVED, 3/4 BLADE

## (undated) DEVICE — TOWEL PACK, STERILE, 4/PACK, BLUE

## (undated) DEVICE — PREP TRAY, VAGINAL

## (undated) DEVICE — TUBE SET, PNEUMOLAR HEATED, SMOKE EVACU, HIGH-FLOW